# Patient Record
Sex: FEMALE | URBAN - METROPOLITAN AREA
[De-identification: names, ages, dates, MRNs, and addresses within clinical notes are randomized per-mention and may not be internally consistent; named-entity substitution may affect disease eponyms.]

---

## 2024-08-27 ENCOUNTER — APPOINTMENT (OUTPATIENT)
Dept: SURGICAL ONCOLOGY | Facility: CLINIC | Age: 69
End: 2024-08-27
Payer: MEDICARE

## 2024-08-27 ENCOUNTER — OFFICE VISIT (OUTPATIENT)
Dept: SURGICAL ONCOLOGY | Facility: CLINIC | Age: 69
End: 2024-08-27
Payer: MEDICARE

## 2024-08-27 VITALS
WEIGHT: 178.13 LBS | TEMPERATURE: 96.6 F | SYSTOLIC BLOOD PRESSURE: 136 MMHG | HEART RATE: 72 BPM | DIASTOLIC BLOOD PRESSURE: 82 MMHG | RESPIRATION RATE: 16 BRPM

## 2024-08-27 DIAGNOSIS — C43.59 MALIGNANT MELANOMA OF BACK (MULTI): Primary | ICD-10-CM

## 2024-08-27 PROCEDURE — 1126F AMNT PAIN NOTED NONE PRSNT: CPT | Performed by: SURGERY

## 2024-08-27 PROCEDURE — 1159F MED LIST DOCD IN RCRD: CPT | Performed by: SURGERY

## 2024-08-27 PROCEDURE — 99215 OFFICE O/P EST HI 40 MIN: CPT | Mod: 57 | Performed by: SURGERY

## 2024-08-27 PROCEDURE — 99205 OFFICE O/P NEW HI 60 MIN: CPT | Performed by: SURGERY

## 2024-08-27 ASSESSMENT — ENCOUNTER SYMPTOMS
DEPRESSION: 0
OCCASIONAL FEELINGS OF UNSTEADINESS: 0
LOSS OF SENSATION IN FEET: 0

## 2024-08-27 ASSESSMENT — PAIN SCALES - GENERAL: PAINLEVEL: 0-NO PAIN

## 2024-08-27 NOTE — PROGRESS NOTES
History and Physical    Referring Provider:  Mery Spring PA-C   No primary care provider on file.    Chief Complaint:  Right upper back melanoma    History of Present Illness:  This is a 68 y.o. female who presents with a right upper back melanoma that was 0.8mm in Breslow depth and nonulcerated. The patient reports that the lesion was found on her first annual skin exam.  She was not aware of the lesion previously.  There was no trauma or bleeding.  Shave biopsy was performed 8/26/2024.  A total-body skin exam was performed at that time     Past Medical History:  nonmelanoma skin cancer    Past Surgical History:  Past Surgical History:  No date: BASAL CELL CARCINOMA EXCISION; Right      Comment:  lateral right eye, excised 15 years ago per patient  2003: HYSTERECTOMY     Medications:  No current outpatient medications     Allergies:  No Known Allergies     Family History:  family history includes Melanoma in her father.     Social History:   Lives in [facility].  Denies tobacco and drug use.  Rare alcohol use right mid upper back    Review of Systems:  A complete 12 point review of systems was performed and is negative except as noted in the history of present illness.    Vital Signs:  Vitals:    08/27/24 0822   BP: 136/82   Pulse: 72   Resp: 16   Temp: 35.9 °C (96.6 °F)        Physical Exam:  GEN: No acute distress, Healthy appearing  HEENT: Moist mucus membranes, normocephalic  CARDS: RRR  PULM: No respiratory distress  GI: Soft, non-distended  LYMPH: No palpable lymphadenopathy in bilateral cervical, and axillary basins  SKIN: Biopsy site noted without evidence of residual disease.  No surround pigmentation or nodularity.  NEURO: No gross sensorimotor deficits  EXT: No arm or leg swelling      Imaging: No imaging for review    Assessment:  This is a 68 y.o. female who presents with a right upper back melanoma that was 0.8mm in Breslow depth and nonulcerated.  The patient has no clinically palpable  lymphadenopathy.  We discussed the recommendation for wide excision of the primary lesion with a 1 cm margin and consideration of sentinel lymph node biopsy.  The patient understand the risks, benefits, and alternatives to surgical lalo staging.  She would like to proceed with a wide excision alone and clinical observation of the lymph node basins    Plan:  -- Wide excision of the right back melanoma with a 1 cm margin.  This will be done in clinic in early September.  Under local anesthesia.  -- The patient understands that moving forward she will need to be well-connected to dermatology.  In light of her family history it is possible that she has a higher propensity for melanomas.  Ongoing clinical examination of the lalo basins will be needed as well.  Self lalo exams were reviewed  -- The patient asked very appropriate questions that were answered to the best of my ability with the current information at hand. He knows to call with any questions or concerns that arise        Leandro Campbell MD, MPH

## 2024-09-06 ENCOUNTER — PROCEDURE VISIT (OUTPATIENT)
Dept: SURGICAL ONCOLOGY | Facility: HOSPITAL | Age: 69
End: 2024-09-06
Payer: MEDICARE

## 2024-09-06 VITALS
SYSTOLIC BLOOD PRESSURE: 140 MMHG | WEIGHT: 176.81 LBS | HEART RATE: 73 BPM | TEMPERATURE: 97.3 F | OXYGEN SATURATION: 100 % | DIASTOLIC BLOOD PRESSURE: 78 MMHG | RESPIRATION RATE: 22 BRPM

## 2024-09-06 DIAGNOSIS — C43.59 MALIGNANT MELANOMA OF BACK (MULTI): Primary | ICD-10-CM

## 2024-09-06 PROCEDURE — 13101 CMPLX RPR TRUNK 2.6-7.5 CM: CPT | Performed by: SURGERY

## 2024-09-06 PROCEDURE — 11603 EXC TR-EXT MAL+MARG 2.1-3 CM: CPT | Performed by: SURGERY

## 2024-09-06 PROCEDURE — 13102 CMPLX RPR TRUNK ADDL 5CM/<: CPT | Performed by: SURGERY

## 2024-09-06 PROCEDURE — 99215 OFFICE O/P EST HI 40 MIN: CPT | Mod: 57 | Performed by: SURGERY

## 2024-09-06 PROCEDURE — 99215 OFFICE O/P EST HI 40 MIN: CPT | Performed by: SURGERY

## 2024-09-06 ASSESSMENT — PAIN SCALES - GENERAL: PAINLEVEL: 0-NO PAIN

## 2024-09-06 NOTE — PROGRESS NOTES
Procedure Visit    Referring Provider:  Mery Spring PA-C  No primary care provider on file.    Chief Complaint:  Right upper back melanoma    History of Present Illness:  This is a 68 y.o. female who presents with a right upper back melanoma that was 0.8mm in Breslow depth and nonulcerated. The patient reports that the lesion was found on her first annual skin exam.  She was not aware of the lesion previously.  There was no trauma or bleeding.  Shave biopsy was performed 8/26/2024.  A total-body skin exam was performed at that time   The patient elected to forego surgical lalo staging.     Surgery 9/6/2024 - Wide excision right back melanoma with 1cm margins  No acute issues reported    Review of Systems:  A complete 12 point review of systems was performed and is negative except as noted in the history of present illness.    Vital Signs:  Vitals:    09/06/24 1343   BP: 140/78   Pulse: 73   Resp: 22   Temp: 36.3 °C (97.3 °F)   SpO2: 100%      Physical Exam:  GEN: No acute distress, Healthy appearing  HEENT: Moist mucus membranes, normocephalic  CARDS: RRR  PULM: No respiratory distress  SKIN: Right back biopsy site noted. 1cm margin drawn. Elliptical marking measuring 2.8x8.2cm  NEURO: No gross sensorimotor deficits  EXT: No arm or leg swelling      Procedure:   After obtaining informed consent, the patient was brought to the procedure room and   Placed in seated position. The back was anesthestized with 1% lidocaine with epinephrine. The skiin was then prepped and draped in sterile fashion. At this point, the primary melanoma on the right back  was addressed. A 1cm margin was marked around the biopsy site. This marking was extended in elliptical fashion resulting in a 2.8x8.2cm marking. The skin was incised sharply and dissection carried to but not through the underlying muscular fascia. The specimen was marked with a short stitch superiorly at 12:00, and a long stitch laterally at 3:00. Skin and subcutaneous  flaps were raised for 1cm in all directions. The wound was irrigated and hemostasis was obtained. The wound was closed in layers with 2-0 vicryl for the scarpas and deep dermal layers, and 4-0 monocryl for the skin. Skin glue was used as a dressing.       Assessment:  This is a 68 y.o. female who presents with a right upper back melanoma that was 0.8mm in Breslow depth and nonulcerated.  The patient has no clinically palpable lymphadenopathy.  We discussed the recommendation for wide excision of the primary lesion with a 1 cm margin and consideration of sentinel lymph node biopsy.  The patient understand the risks, benefits, and alternatives to surgical lalo staging.  She would like to proceed with a wide excision alone and clinical observation of the lymph node basins    Plan:  -- Follow up excision pathology   -- The patient asked very appropriate questions that were answered to the best of my ability with the current information at hand. He knows to call with any questions or concerns that arise        Leandro Campbell MD, MPH

## 2024-09-11 LAB
LABORATORY COMMENT REPORT: NORMAL
PATH REPORT.FINAL DX SPEC: NORMAL
PATH REPORT.GROSS SPEC: NORMAL
PATH REPORT.RELEVANT HX SPEC: NORMAL
PATH REPORT.TOTAL CANCER: NORMAL
PATHOLOGY SYNOPTIC REPORT: NORMAL

## 2024-09-24 ENCOUNTER — OFFICE VISIT (OUTPATIENT)
Dept: SURGICAL ONCOLOGY | Facility: CLINIC | Age: 69
End: 2024-09-24
Payer: MEDICARE

## 2024-09-24 VITALS
OXYGEN SATURATION: 98 % | RESPIRATION RATE: 18 BRPM | WEIGHT: 179 LBS | TEMPERATURE: 96.8 F | HEART RATE: 76 BPM | DIASTOLIC BLOOD PRESSURE: 84 MMHG | SYSTOLIC BLOOD PRESSURE: 132 MMHG

## 2024-09-24 DIAGNOSIS — C43.59 MALIGNANT MELANOMA OF BACK (MULTI): Primary | ICD-10-CM

## 2024-09-24 PROCEDURE — 99211 OFF/OP EST MAY X REQ PHY/QHP: CPT | Performed by: SURGERY

## 2024-09-24 PROCEDURE — 1126F AMNT PAIN NOTED NONE PRSNT: CPT | Performed by: SURGERY

## 2024-09-24 PROCEDURE — 1036F TOBACCO NON-USER: CPT | Performed by: SURGERY

## 2024-09-24 ASSESSMENT — PAIN SCALES - GENERAL: PAINLEVEL: 0-NO PAIN

## 2024-09-24 NOTE — PROGRESS NOTES
Post-Procedure Visit    Referring Provider:  Mery Spring PA-C  No primary care provider on file.    Chief Complaint:  Right upper back melanoma    History of Present Illness:  This is a 68 y.o. female who presents with a right upper back melanoma that was 0.8mm in Breslow depth and nonulcerated. The patient reports that the lesion was found on her first annual skin exam.  She was not aware of the lesion previously.  There was no trauma or bleeding.  Shave biopsy was performed 8/26/2024.  A total-body skin exam was performed at that time   The patient elected to forego surgical lalo staging.     Surgery 9/6/2024 - Wide excision right back melanoma with 1cm margins  Pathology - Residual MIS and SCCis - margins clear    9/24/2024 - POV. Recovered very well. No swelling or pain. Interested in being allowed to soak in a bath    Review of Systems:  A complete 12 point review of systems was performed and is negative except as noted in the history of present illness.    Vital Signs:  Vitals:    09/24/24 1201   BP: 132/84   Pulse: 76   Resp: 18   Temp: 36 °C (96.8 °F)   SpO2: 98%     Physical Exam:  GEN: No acute distress, Healthy appearing  HEENT: Moist mucus membranes, normocephalic  CARDS: RRR  PULM: No respiratory distress  SKIN: Right back incision well approximated without swelling or drainage.  LYMPH: No palpably lymphadenopathy in the right axilla. Self lalo exam demonstrated  NEURO: No gross sensorimotor deficits  EXT: No arm or leg swelling      Assessment:  This is a 68 y.o. female who presents with a right upper back melanoma that was 0.8mm in Breslow depth and nonulcerated.  The patient has no clinically palpable lymphadenopathy.  We discussed the recommendation for wide excision of the primary lesion with a 1 cm margin and consideration of sentinel lymph node biopsy.  The patient understand the risks, benefits, and alternatives to surgical lalo staging.  She would like to proceed with a wide excision alone  and clinical observation of the lymph node basins    Surgery 9/6/2024 - Stage IB    Plan:  -- Follow primarily with Dermatology for Total body Skin and Irvin exams.   -- I will be available to see the patient at any time for irvin exams or to address any future concerns  -- The patient asked very appropriate questions that were answered to the best of my ability with the current information at hand. He knows to call with any questions or concerns that arise        Leandro Campbell MD, MPH

## 2024-11-11 ENCOUNTER — TUMOR BOARD CONFERENCE (OUTPATIENT)
Dept: HEMATOLOGY/ONCOLOGY | Facility: HOSPITAL | Age: 69
End: 2024-11-11
Payer: MEDICARE

## 2024-11-11 DIAGNOSIS — C43.9 MELANOMA OF SKIN (MULTI): Primary | ICD-10-CM

## 2024-11-11 NOTE — TUMOR BOARD NOTE
General Patient Information  Name:  Alize Guillory  Evaluation #:  1  Conference Date:  11/11/2024  YOB: 1955  MRN:  [id number]  Program Physician(s):  Luis Abdalla  Referring Physician(s):  Mery Hunter      Summary   Stage:  cIB ([id number])   Melanoma 5 year survival: 97%    Assessment:  Right back with a non-ulcerated superficial spreading melanoma, Breslow of 0.8 mm    S/p WLE with 1 cm margins, showing melanoma in situ and SCCis, inked margins clear. Adequately surgically treated.    Of note, patient declined SLNB.    Recommendation: Consider surveillance U/S of lalo basins. Annual H&P.    Review Multidisciplinary Cutaneous Oncology Conference recommendation with patient.  Continue routine follow up and total body skin exams with Mery Spring.    Follow Up:  Mery Hunter      History and Physical Exam  Dermatologic History:   68 y.o. female with a biopsy of the right back on 8/26/2024 showing a non-ulcerated superficial spreading melanoma, Breslow: 0.8 mm.    S/p WLE with 1 cm margins on 9/6/2024, showing melanoma in situ and SCCis, inked margins free    Of note, there is no pre-op pathology for this patient located in our system and the synoptic report will be based on only the current post-op pathology    Of note, patient declined SLNB    Past Medical History:  No past medical history on file.    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     Note: Microscopic examination reveals a specimen that extends into the subcutaneous fat. An area with horizontally oriented collagen and vertically oriented vessels is present. In a tip towards the 12 o'clock margin there is an area of epidermal hyperplasia with full  thickness keratinocyte atypia. In slide A8 adjacent to the area of dermal fibrosis there is a proliferation of nested and single melanocytes throughout all layers of the epidermis. Multiple step sections were performed.     No prior pathology is available in our system and the synoptic report will be based on current pathology only.     ** Electronically signed out by Abundio Thompson MD **    SPECIMEN   Procedure  Re-excision   Specimen Laterality  Right   TUMOR   Tumor Site  Skin of trunk: Right back        Histologic Type  Melanoma in situ, superficial spreading type (low-cumulative sun damage (CSD) melanoma in situ)   Ulceration  Not identified   Tumor Regression  Not identified   MARGINS     Margin Status for Melanoma in Situ  All margins negative for melanoma in situ   PATHOLOGIC STAGE CLASSIFICATION (pTNM, AJCC 8th Edition)     pT Category  pTis     Radiology      Clinical Images  9/6/2024     Name band;